# Patient Record
Sex: MALE | NOT HISPANIC OR LATINO | URBAN - METROPOLITAN AREA
[De-identification: names, ages, dates, MRNs, and addresses within clinical notes are randomized per-mention and may not be internally consistent; named-entity substitution may affect disease eponyms.]

---

## 2017-01-13 ENCOUNTER — EMERGENCY (EMERGENCY)
Facility: HOSPITAL | Age: 62
LOS: 1 days | Discharge: HOME | End: 2017-01-13
Attending: EMERGENCY MEDICINE | Admitting: EMERGENCY MEDICINE
Payer: COMMERCIAL

## 2017-01-13 VITALS
DIASTOLIC BLOOD PRESSURE: 73 MMHG | SYSTOLIC BLOOD PRESSURE: 136 MMHG | OXYGEN SATURATION: 100 % | HEART RATE: 86 BPM | RESPIRATION RATE: 22 BRPM

## 2017-01-13 VITALS
DIASTOLIC BLOOD PRESSURE: 66 MMHG | SYSTOLIC BLOOD PRESSURE: 124 MMHG | OXYGEN SATURATION: 99 % | TEMPERATURE: 102 F | RESPIRATION RATE: 18 BRPM | HEART RATE: 94 BPM

## 2017-01-13 LAB
ALBUMIN SERPL ELPH-MCNC: 3.8 G/DL — SIGNIFICANT CHANGE UP (ref 3.3–5)
ALP SERPL-CCNC: 68 U/L — SIGNIFICANT CHANGE UP (ref 40–120)
ALT FLD-CCNC: 34 U/L — SIGNIFICANT CHANGE UP (ref 4–41)
APPEARANCE UR: SIGNIFICANT CHANGE UP
AST SERPL-CCNC: 30 U/L — SIGNIFICANT CHANGE UP (ref 4–40)
BACTERIA # UR AUTO: SIGNIFICANT CHANGE UP
BASE EXCESS BLDV CALC-SCNC: -1 MMOL/L — SIGNIFICANT CHANGE UP
BASOPHILS # BLD AUTO: 0.04 K/UL — SIGNIFICANT CHANGE UP (ref 0–0.2)
BASOPHILS NFR BLD AUTO: 0.3 % — SIGNIFICANT CHANGE UP (ref 0–2)
BILIRUB SERPL-MCNC: 0.5 MG/DL — SIGNIFICANT CHANGE UP (ref 0.2–1.2)
BILIRUB UR-MCNC: NEGATIVE — SIGNIFICANT CHANGE UP
BLOOD GAS VENOUS - CREATININE: 1.2 MG/DL — SIGNIFICANT CHANGE UP (ref 0.5–1.3)
BLOOD UR QL VISUAL: HIGH
BUN SERPL-MCNC: 15 MG/DL — SIGNIFICANT CHANGE UP (ref 7–23)
CALCIUM SERPL-MCNC: 9.3 MG/DL — SIGNIFICANT CHANGE UP (ref 8.4–10.5)
CHLORIDE BLDV-SCNC: 100 MMOL/L — SIGNIFICANT CHANGE UP (ref 96–108)
CHLORIDE SERPL-SCNC: 94 MMOL/L — LOW (ref 98–107)
CO2 SERPL-SCNC: 20 MMOL/L — LOW (ref 22–31)
COLOR SPEC: YELLOW — SIGNIFICANT CHANGE UP
CREAT SERPL-MCNC: 1.28 MG/DL — SIGNIFICANT CHANGE UP (ref 0.5–1.3)
EOSINOPHIL # BLD AUTO: 0.03 K/UL — SIGNIFICANT CHANGE UP (ref 0–0.5)
EOSINOPHIL NFR BLD AUTO: 0.2 % — SIGNIFICANT CHANGE UP (ref 0–6)
GAS PNL BLDV: 132 MMOL/L — LOW (ref 136–146)
GLUCOSE BLDV-MCNC: 106 — HIGH (ref 70–99)
GLUCOSE SERPL-MCNC: 105 MG/DL — HIGH (ref 70–99)
GLUCOSE UR-MCNC: NEGATIVE — SIGNIFICANT CHANGE UP
GRAM STAIN BLOOD: SIGNIFICANT CHANGE UP
GRAM STAIN BLOOD: SIGNIFICANT CHANGE UP
HCO3 BLDV-SCNC: 22 MMOL/L — SIGNIFICANT CHANGE UP (ref 20–27)
HCT VFR BLD CALC: 40 % — SIGNIFICANT CHANGE UP (ref 39–50)
HCT VFR BLDV CALC: 41.2 % — SIGNIFICANT CHANGE UP (ref 39–51)
HGB BLD-MCNC: 13.6 G/DL — SIGNIFICANT CHANGE UP (ref 13–17)
HGB BLDV-MCNC: 13.4 G/DL — SIGNIFICANT CHANGE UP (ref 13–17)
IMM GRANULOCYTES NFR BLD AUTO: 0.5 % — SIGNIFICANT CHANGE UP (ref 0–1.5)
KETONES UR-MCNC: NEGATIVE — SIGNIFICANT CHANGE UP
LACTATE BLDV-MCNC: 2.3 MMOL/L — HIGH (ref 0.5–2)
LEUKOCYTE ESTERASE UR-ACNC: HIGH
LYMPHOCYTES # BLD AUTO: 1.96 K/UL — SIGNIFICANT CHANGE UP (ref 1–3.3)
LYMPHOCYTES # BLD AUTO: 15 % — SIGNIFICANT CHANGE UP (ref 13–44)
MCHC RBC-ENTMCNC: 29.1 PG — SIGNIFICANT CHANGE UP (ref 27–34)
MCHC RBC-ENTMCNC: 34 % — SIGNIFICANT CHANGE UP (ref 32–36)
MCV RBC AUTO: 85.5 FL — SIGNIFICANT CHANGE UP (ref 80–100)
MONOCYTES # BLD AUTO: 1.52 K/UL — HIGH (ref 0–0.9)
MONOCYTES NFR BLD AUTO: 11.6 % — SIGNIFICANT CHANGE UP (ref 2–14)
MUCOUS THREADS # UR AUTO: SIGNIFICANT CHANGE UP
NEUTROPHILS # BLD AUTO: 9.44 K/UL — HIGH (ref 1.8–7.4)
NEUTROPHILS NFR BLD AUTO: 72.4 % — SIGNIFICANT CHANGE UP (ref 43–77)
NITRITE UR-MCNC: NEGATIVE — SIGNIFICANT CHANGE UP
NON-SQ EPI CELLS # UR AUTO: <1 — SIGNIFICANT CHANGE UP
PCO2 BLDV: 38 MMHG — LOW (ref 41–51)
PH BLDV: 7.4 PH — SIGNIFICANT CHANGE UP (ref 7.32–7.43)
PH UR: 6 — SIGNIFICANT CHANGE UP (ref 4.6–8)
PLATELET # BLD AUTO: 358 K/UL — SIGNIFICANT CHANGE UP (ref 150–400)
PMV BLD: 9.5 FL — SIGNIFICANT CHANGE UP (ref 7–13)
PO2 BLDV: 23 MMHG — LOW (ref 35–40)
POTASSIUM BLDV-SCNC: 4 MMOL/L — SIGNIFICANT CHANGE UP (ref 3.4–4.5)
POTASSIUM SERPL-MCNC: 4.1 MMOL/L — SIGNIFICANT CHANGE UP (ref 3.5–5.3)
POTASSIUM SERPL-SCNC: 4.1 MMOL/L — SIGNIFICANT CHANGE UP (ref 3.5–5.3)
PROT SERPL-MCNC: 8 G/DL — SIGNIFICANT CHANGE UP (ref 6–8.3)
PROT UR-MCNC: 20 — SIGNIFICANT CHANGE UP
RBC # BLD: 4.68 M/UL — SIGNIFICANT CHANGE UP (ref 4.2–5.8)
RBC # FLD: 11.9 % — SIGNIFICANT CHANGE UP (ref 10.3–14.5)
RBC CASTS # UR COMP ASSIST: HIGH (ref 0–?)
SAO2 % BLDV: 34.5 % — LOW (ref 60–85)
SODIUM SERPL-SCNC: 133 MMOL/L — LOW (ref 135–145)
SP GR SPEC: 1.02 — SIGNIFICANT CHANGE UP (ref 1–1.03)
SPECIMEN SOURCE: SIGNIFICANT CHANGE UP
SPECIMEN SOURCE: SIGNIFICANT CHANGE UP
UROBILINOGEN FLD QL: NORMAL E.U. — SIGNIFICANT CHANGE UP (ref 0.1–0.2)
WBC # BLD: 13.06 K/UL — HIGH (ref 3.8–10.5)
WBC # FLD AUTO: 13.06 K/UL — HIGH (ref 3.8–10.5)
WBC UR QL: SIGNIFICANT CHANGE UP (ref 0–?)

## 2017-01-13 PROCEDURE — 99284 EMERGENCY DEPT VISIT MOD MDM: CPT

## 2017-01-13 PROCEDURE — 71020: CPT | Mod: 26

## 2017-01-13 RX ORDER — ACETAMINOPHEN 500 MG
650 TABLET ORAL ONCE
Qty: 0 | Refills: 0 | Status: COMPLETED | OUTPATIENT
Start: 2017-01-13 | End: 2017-01-13

## 2017-01-13 RX ORDER — MOXIFLOXACIN HYDROCHLORIDE TABLETS, 400 MG 400 MG/1
1 TABLET, FILM COATED ORAL
Qty: 56 | Refills: 0 | OUTPATIENT
Start: 2017-01-13 | End: 2017-02-10

## 2017-01-13 RX ORDER — IBUPROFEN 200 MG
600 TABLET ORAL ONCE
Qty: 0 | Refills: 0 | Status: COMPLETED | OUTPATIENT
Start: 2017-01-13 | End: 2017-01-13

## 2017-01-13 RX ORDER — SODIUM CHLORIDE 9 MG/ML
1000 INJECTION INTRAMUSCULAR; INTRAVENOUS; SUBCUTANEOUS ONCE
Qty: 0 | Refills: 0 | Status: COMPLETED | OUTPATIENT
Start: 2017-01-13 | End: 2017-01-13

## 2017-01-13 RX ADMIN — SODIUM CHLORIDE 1000 MILLILITER(S): 9 INJECTION INTRAMUSCULAR; INTRAVENOUS; SUBCUTANEOUS at 16:55

## 2017-01-13 RX ADMIN — Medication 650 MILLIGRAM(S): at 16:55

## 2017-01-13 RX ADMIN — Medication 600 MILLIGRAM(S): at 16:55

## 2017-01-13 NOTE — ED ADULT TRIAGE NOTE - CHIEF COMPLAINT QUOTE
Patient brought to ER from home by EMS for fever and weakness. Pt had a biopsy of prostate on 1/6/2017. IVL placed by EMS 20 gauge with NS infusing.

## 2017-01-13 NOTE — ED PROVIDER NOTE - MEDICAL DECISION MAKING DETAILS
60yo M PMHx HTN HLD p/w CC fever/weakness will send for cbc, cmp, vbg, blood cultures, cxr, ua, will give tylenol, motrin, fluids and reevaluate

## 2017-01-13 NOTE — ED ADULT NURSE NOTE - OBJECTIVE STATEMENT
Pt is 62yo M PMHx HTN HLD p/w CC fever/weakness - pt. states that for the past 2 weeks he has been experiencing URI symptoms: cough productive yellow sputum, weakness  - he was seen by urgent care about 2 weeks ago and treated with a zpack - he states that his symptoms still persist and over the past couple of days he feels his symptoms have been worsening and are now associated w/ night sweats, chills, and today spiked a fever. Pt. reports having a prostate biopsy on 1/6/17 with no complications since. Denies HA, CP, SOB, N/V/D.

## 2017-01-15 LAB — SPECIMEN SOURCE: SIGNIFICANT CHANGE UP

## 2017-01-16 ENCOUNTER — INPATIENT (INPATIENT)
Facility: HOSPITAL | Age: 62
LOS: 1 days | Discharge: HOME HEALTH | End: 2017-01-18
Attending: HOSPITALIST | Admitting: HOSPITALIST
Payer: COMMERCIAL

## 2017-01-16 VITALS
OXYGEN SATURATION: 98 % | SYSTOLIC BLOOD PRESSURE: 125 MMHG | RESPIRATION RATE: 18 BRPM | HEART RATE: 93 BPM | TEMPERATURE: 98 F | DIASTOLIC BLOOD PRESSURE: 85 MMHG

## 2017-01-16 DIAGNOSIS — E78.5 HYPERLIPIDEMIA, UNSPECIFIED: ICD-10-CM

## 2017-01-16 DIAGNOSIS — B96.20 UNSPECIFIED ESCHERICHIA COLI [E. COLI] AS THE CAUSE OF DISEASES CLASSIFIED ELSEWHERE: ICD-10-CM

## 2017-01-16 DIAGNOSIS — Z09 ENCOUNTER FOR FOLLOW-UP EXAMINATION AFTER COMPLETED TREATMENT FOR CONDITIONS OTHER THAN MALIGNANT NEOPLASM: Chronic | ICD-10-CM

## 2017-01-16 DIAGNOSIS — I10 ESSENTIAL (PRIMARY) HYPERTENSION: ICD-10-CM

## 2017-01-16 DIAGNOSIS — A49.9 BACTERIAL INFECTION, UNSPECIFIED: ICD-10-CM

## 2017-01-16 LAB
-  AMIKACIN: SIGNIFICANT CHANGE UP
-  AMPICILLIN/SULBACTAM: SIGNIFICANT CHANGE UP
-  AMPICILLIN: SIGNIFICANT CHANGE UP
-  AZTREONAM: SIGNIFICANT CHANGE UP
-  CEFAZOLIN: SIGNIFICANT CHANGE UP
-  CEFEPIME: SIGNIFICANT CHANGE UP
-  CEFOXITIN: SIGNIFICANT CHANGE UP
-  CEFTAZIDIME: SIGNIFICANT CHANGE UP
-  CEFTRIAXONE: SIGNIFICANT CHANGE UP
-  CIPROFLOXACIN: SIGNIFICANT CHANGE UP
-  ERTAPENEM: SIGNIFICANT CHANGE UP
-  GENTAMICIN: SIGNIFICANT CHANGE UP
-  IMIPENEM: SIGNIFICANT CHANGE UP
-  LEVOFLOXACIN: SIGNIFICANT CHANGE UP
-  MEROPENEM: SIGNIFICANT CHANGE UP
-  NITROFURANTOIN: SIGNIFICANT CHANGE UP
-  PIPERACILLIN/TAZOBACTAM: SIGNIFICANT CHANGE UP
-  TIGECYCLINE: SIGNIFICANT CHANGE UP
-  TOBRAMYCIN: SIGNIFICANT CHANGE UP
-  TRIMETHOPRIM/SULFAMETHOXAZOLE: SIGNIFICANT CHANGE UP
ALBUMIN SERPL ELPH-MCNC: 3.9 G/DL — SIGNIFICANT CHANGE UP (ref 3.3–5)
ALP SERPL-CCNC: 72 U/L — SIGNIFICANT CHANGE UP (ref 40–120)
ALT FLD-CCNC: 24 U/L — SIGNIFICANT CHANGE UP (ref 4–41)
ANISOCYTOSIS BLD QL: SLIGHT — SIGNIFICANT CHANGE UP
AST SERPL-CCNC: 15 U/L — SIGNIFICANT CHANGE UP (ref 4–40)
BACTERIA UR CULT: SIGNIFICANT CHANGE UP
BASE EXCESS BLDV CALC-SCNC: 2.3 MMOL/L — SIGNIFICANT CHANGE UP
BASOPHILS # BLD AUTO: 0.08 K/UL — SIGNIFICANT CHANGE UP (ref 0–0.2)
BASOPHILS NFR BLD AUTO: 0.8 % — SIGNIFICANT CHANGE UP (ref 0–2)
BILIRUB SERPL-MCNC: 0.3 MG/DL — SIGNIFICANT CHANGE UP (ref 0.2–1.2)
BLOOD GAS VENOUS - CREATININE: 1.17 MG/DL — SIGNIFICANT CHANGE UP (ref 0.5–1.3)
BUN SERPL-MCNC: 13 MG/DL — SIGNIFICANT CHANGE UP (ref 7–23)
CALCIUM SERPL-MCNC: 9.9 MG/DL — SIGNIFICANT CHANGE UP (ref 8.4–10.5)
CHLORIDE BLDV-SCNC: 99 MMOL/L — SIGNIFICANT CHANGE UP (ref 96–108)
CHLORIDE SERPL-SCNC: 96 MMOL/L — LOW (ref 98–107)
CO2 SERPL-SCNC: 25 MMOL/L — SIGNIFICANT CHANGE UP (ref 22–31)
CREAT SERPL-MCNC: 1.14 MG/DL — SIGNIFICANT CHANGE UP (ref 0.5–1.3)
EOSINOPHIL # BLD AUTO: 0.45 K/UL — SIGNIFICANT CHANGE UP (ref 0–0.5)
EOSINOPHIL NFR BLD AUTO: 4.5 % — SIGNIFICANT CHANGE UP (ref 0–6)
GAS PNL BLDV: 134 MMOL/L — LOW (ref 136–146)
GLUCOSE BLDV-MCNC: 103 — HIGH (ref 70–99)
GLUCOSE SERPL-MCNC: 102 MG/DL — HIGH (ref 70–99)
HCO3 BLDV-SCNC: 23 MMOL/L — SIGNIFICANT CHANGE UP (ref 20–27)
HCT VFR BLD CALC: 39.9 % — SIGNIFICANT CHANGE UP (ref 39–50)
HCT VFR BLDV CALC: 44.3 % — SIGNIFICANT CHANGE UP (ref 39–51)
HGB BLD-MCNC: 13.8 G/DL — SIGNIFICANT CHANGE UP (ref 13–17)
HGB BLDV-MCNC: 14.5 G/DL — SIGNIFICANT CHANGE UP (ref 13–17)
IMM GRANULOCYTES NFR BLD AUTO: 0.8 % — SIGNIFICANT CHANGE UP (ref 0–1.5)
LACTATE BLDV-MCNC: 1.7 MMOL/L — SIGNIFICANT CHANGE UP (ref 0.5–2)
LYMPHOCYTES # BLD AUTO: 3.09 K/UL — SIGNIFICANT CHANGE UP (ref 1–3.3)
LYMPHOCYTES # BLD AUTO: 30.7 % — SIGNIFICANT CHANGE UP (ref 13–44)
MANUAL SMEAR VERIFICATION: SIGNIFICANT CHANGE UP
MCHC RBC-ENTMCNC: 29.3 PG — SIGNIFICANT CHANGE UP (ref 27–34)
MCHC RBC-ENTMCNC: 34.6 % — SIGNIFICANT CHANGE UP (ref 32–36)
MCV RBC AUTO: 84.7 FL — SIGNIFICANT CHANGE UP (ref 80–100)
METHOD TYPE: SIGNIFICANT CHANGE UP
MONOCYTES # BLD AUTO: 1.4 K/UL — HIGH (ref 0–0.9)
MONOCYTES NFR BLD AUTO: 13.9 % — SIGNIFICANT CHANGE UP (ref 2–14)
NEUTROPHILS # BLD AUTO: 4.96 K/UL — SIGNIFICANT CHANGE UP (ref 1.8–7.4)
NEUTROPHILS NFR BLD AUTO: 49.3 % — SIGNIFICANT CHANGE UP (ref 43–77)
ORGANISM # SPEC MICROSCOPIC CNT: SIGNIFICANT CHANGE UP
ORGANISM # SPEC MICROSCOPIC CNT: SIGNIFICANT CHANGE UP
OVALOCYTES BLD QL SMEAR: SLIGHT — SIGNIFICANT CHANGE UP
PCO2 BLDV: 60 MMHG — HIGH (ref 41–51)
PH BLDV: 7.29 PH — LOW (ref 7.32–7.43)
PLATELET # BLD AUTO: 421 K/UL — HIGH (ref 150–400)
PLATELET COUNT - ESTIMATE: NORMAL — SIGNIFICANT CHANGE UP
PMV BLD: 9 FL — SIGNIFICANT CHANGE UP (ref 7–13)
PO2 BLDV: < 24 MMHG — LOW (ref 35–40)
POTASSIUM BLDV-SCNC: 4.2 MMOL/L — SIGNIFICANT CHANGE UP (ref 3.4–4.5)
POTASSIUM SERPL-MCNC: 4.4 MMOL/L — SIGNIFICANT CHANGE UP (ref 3.5–5.3)
POTASSIUM SERPL-SCNC: 4.4 MMOL/L — SIGNIFICANT CHANGE UP (ref 3.5–5.3)
PROT SERPL-MCNC: 8.1 G/DL — SIGNIFICANT CHANGE UP (ref 6–8.3)
RBC # BLD: 4.71 M/UL — SIGNIFICANT CHANGE UP (ref 4.2–5.8)
RBC # FLD: 12.1 % — SIGNIFICANT CHANGE UP (ref 10.3–14.5)
SAO2 % BLDV: 17.8 % — LOW (ref 60–85)
SODIUM SERPL-SCNC: 135 MMOL/L — SIGNIFICANT CHANGE UP (ref 135–145)
WBC # BLD: 10.06 K/UL — SIGNIFICANT CHANGE UP (ref 3.8–10.5)
WBC # FLD AUTO: 10.06 K/UL — SIGNIFICANT CHANGE UP (ref 3.8–10.5)

## 2017-01-16 PROCEDURE — 99223 1ST HOSP IP/OBS HIGH 75: CPT

## 2017-01-16 RX ORDER — ATORVASTATIN CALCIUM 80 MG/1
10 TABLET, FILM COATED ORAL AT BEDTIME
Qty: 0 | Refills: 0 | Status: DISCONTINUED | OUTPATIENT
Start: 2017-01-16 | End: 2017-01-18

## 2017-01-16 RX ORDER — ERTAPENEM SODIUM 1 G/1
1000 INJECTION, POWDER, LYOPHILIZED, FOR SOLUTION INTRAMUSCULAR; INTRAVENOUS ONCE
Qty: 0 | Refills: 0 | Status: COMPLETED | OUTPATIENT
Start: 2017-01-16 | End: 2017-01-16

## 2017-01-16 RX ORDER — ERTAPENEM SODIUM 1 G/1
INJECTION, POWDER, LYOPHILIZED, FOR SOLUTION INTRAMUSCULAR; INTRAVENOUS
Qty: 0 | Refills: 0 | Status: DISCONTINUED | OUTPATIENT
Start: 2017-01-16 | End: 2017-01-18

## 2017-01-16 RX ORDER — LISINOPRIL 2.5 MG/1
5 TABLET ORAL DAILY
Qty: 0 | Refills: 0 | Status: DISCONTINUED | OUTPATIENT
Start: 2017-01-16 | End: 2017-01-18

## 2017-01-16 RX ORDER — ENOXAPARIN SODIUM 100 MG/ML
40 INJECTION SUBCUTANEOUS EVERY 24 HOURS
Qty: 0 | Refills: 0 | Status: DISCONTINUED | OUTPATIENT
Start: 2017-01-16 | End: 2017-01-18

## 2017-01-16 RX ORDER — ERTAPENEM SODIUM 1 G/1
1000 INJECTION, POWDER, LYOPHILIZED, FOR SOLUTION INTRAMUSCULAR; INTRAVENOUS EVERY 24 HOURS
Qty: 0 | Refills: 0 | Status: DISCONTINUED | OUTPATIENT
Start: 2017-01-17 | End: 2017-01-18

## 2017-01-16 RX ADMIN — ERTAPENEM SODIUM 120 MILLIGRAM(S): 1 INJECTION, POWDER, LYOPHILIZED, FOR SOLUTION INTRAMUSCULAR; INTRAVENOUS at 18:14

## 2017-01-16 NOTE — ED PROVIDER NOTE - MEDICAL DECISION MAKING DETAILS
61M p/w called back for resistant organism.  Resistant to all appropriate oral antibiotics.  Discussed with ID - this infections requires IV abx and admission - they will see pt in hospital tomorrow AM.  Will arrange for admission and rx ertapenem as recommended.  Clinically not septic.  Will place on isolation.

## 2017-01-16 NOTE — ED POST DISCHARGE NOTE - RESULT SUMMARY
UCX: Gram neg rods 10,000-49,000 ( prelim ). Pt treated with Cipro UCX: Gram neg rods 10,000-49,000 ( prelim ). Pt treated with Cipro s/p prostate biopsy 1/6/17

## 2017-01-16 NOTE — ED ADULT TRIAGE NOTE - CHIEF COMPLAINT QUOTE
p/t is 8 weeks pregnant c/o of unable to tolerate any food or liquid for past few days sent by pmd for iv hydration p/t seen in rd few days ago for dysuria and lower back pain p/t called back today for positive urine cultures

## 2017-01-16 NOTE — ED PROVIDER NOTE - OBJECTIVE STATEMENT
61M p/w called back for +urine culture result growing ESBL E-coli.  Pt was seen Jan 13th for fever to 102.6 after having a respiratory infection since dec 25th despite zpack.  Pt had had a prostate biopsy Jan 6th at OSH.  PMD not affiliated either.  Blood cultures were negative.  Pt was discharged on cipro x 4 weeks for presumed prostatitis.  Pt feeling better, no further fever, but endorses some dysuria starting 2 d ago as well as occasional hematuria.  Otherwise unremarkable ROS.

## 2017-01-16 NOTE — H&P ADULT. - HISTORY OF PRESENT ILLNESS
60 y/o M with HTN, and HLD presents to the ED after urine culture resulted positive for ESBL producing E coli.  Pt reports he had prostate biopsy performed on 1/6 for mildly elevated PSA.  Prior.  Following the procedure he felt well without issues.  He developed fever to 102.6 on 1/13 for which he presented to the ED.  Prior to ED visit, he had been having symptoms of sinus pressure since late December, and had taken a course of azithromycin early January.  In the ED, he had UA with mod LE and + WBC, and was discharged home on a 4 week course of Cipro to treat presumed prostatitis.  Pt reports he developed some dysuria following his ER visit, but that his fever has resolved.  He reports no perineal or pelvic pain.  He was called back to the ED today because his urine culture resulted positive for ESBL producing E coli.  He was given ertapenem in the ED.

## 2017-01-16 NOTE — ED POST DISCHARGE NOTE - OTHER COMMUNICATION
Plan: Admin team to follow up final results Plan: Admin team to follow up final results. Micro called UCX: Ecoli + ESBL 10,000-49,000. Pt treated with cipro (sensitive). Pt called and informed UCX results. Pt denies fever or worsening s/s but has h/o recent prostate biopsy  Pt instructed to return to ED for evaluation Plan: Admin team to follow up final results. Micro called UCX: Ecoli + ESBL 10,000-49,000. Pt treated with cipro (sensitive). Pt called and informed UCX results. Pt denies fever or worsening s/s but has h/o recent prostate biopsy 1/6/17  Pt instructed to return to ED for evaluation Plan: Admin team to follow up final results. Micro called UCX: Ecoli + ESBL 10,000-49,000. Pt treated with cipro (sensitive). Pt called and informed UCX results. Pt denies fever or worsening s/s but has h/o recent prostate biopsy 1/6/17  Pt instructed to return to ED for evaluation and treatment today. Pt acknowledges understanding of this information and states he will return to ED today

## 2017-01-16 NOTE — H&P ADULT. - ASSESSMENT
60 y/o M with HTN and HLD presents after urine culture mildly positive for ESBL producing E coli with recent fever and mild dysuria both resolved.

## 2017-01-16 NOTE — ED ADULT NURSE NOTE - CHIEF COMPLAINT QUOTE
p/t seen in rd few days ago for dysuria and lower back pain p/t called back today for positive urine cultures

## 2017-01-16 NOTE — H&P ADULT. - FAMILY HISTORY
Father  Still living? Unknown  Family history of prostate cancer in father, Age at diagnosis: Age Unknown

## 2017-01-16 NOTE — ED PROVIDER NOTE - ENMT, MLM
Airway patent, Nasal mucosa clear. Mouth with dry normal mucosa. Throat has no vesicles, no oropharyngeal exudates and uvula is midline.

## 2017-01-16 NOTE — ED ADULT NURSE NOTE - OBJECTIVE STATEMENT
pt received a&ox3, called back for urine culture, pt states he was here a few days ago after feeling febrile post prostate biopsy, pt denies any symptoms currently, 20 gauge IV placed in right ac, labs drawn and sent, will continue to monitor.

## 2017-01-17 LAB
BUN SERPL-MCNC: 13 MG/DL — SIGNIFICANT CHANGE UP (ref 7–23)
CALCIUM SERPL-MCNC: 9.3 MG/DL — SIGNIFICANT CHANGE UP (ref 8.4–10.5)
CHLORIDE SERPL-SCNC: 97 MMOL/L — LOW (ref 98–107)
CO2 SERPL-SCNC: 22 MMOL/L — SIGNIFICANT CHANGE UP (ref 22–31)
CREAT SERPL-MCNC: 1.1 MG/DL — SIGNIFICANT CHANGE UP (ref 0.5–1.3)
GLUCOSE SERPL-MCNC: 100 MG/DL — HIGH (ref 70–99)
HCT VFR BLD CALC: 36.4 % — LOW (ref 39–50)
HGB BLD-MCNC: 12.6 G/DL — LOW (ref 13–17)
MAGNESIUM SERPL-MCNC: 2.2 MG/DL — SIGNIFICANT CHANGE UP (ref 1.6–2.6)
MCHC RBC-ENTMCNC: 29.1 PG — SIGNIFICANT CHANGE UP (ref 27–34)
MCHC RBC-ENTMCNC: 34.6 % — SIGNIFICANT CHANGE UP (ref 32–36)
MCV RBC AUTO: 84.1 FL — SIGNIFICANT CHANGE UP (ref 80–100)
PLATELET # BLD AUTO: 407 K/UL — HIGH (ref 150–400)
PMV BLD: 9 FL — SIGNIFICANT CHANGE UP (ref 7–13)
POTASSIUM SERPL-MCNC: 4.9 MMOL/L — SIGNIFICANT CHANGE UP (ref 3.5–5.3)
POTASSIUM SERPL-SCNC: 4.9 MMOL/L — SIGNIFICANT CHANGE UP (ref 3.5–5.3)
RBC # BLD: 4.33 M/UL — SIGNIFICANT CHANGE UP (ref 4.2–5.8)
RBC # FLD: 12.1 % — SIGNIFICANT CHANGE UP (ref 10.3–14.5)
SODIUM SERPL-SCNC: 135 MMOL/L — SIGNIFICANT CHANGE UP (ref 135–145)
SPECIMEN SOURCE: SIGNIFICANT CHANGE UP
SPECIMEN SOURCE: SIGNIFICANT CHANGE UP
WBC # BLD: 9.47 K/UL — SIGNIFICANT CHANGE UP (ref 3.8–10.5)
WBC # FLD AUTO: 9.47 K/UL — SIGNIFICANT CHANGE UP (ref 3.8–10.5)

## 2017-01-17 PROCEDURE — 99233 SBSQ HOSP IP/OBS HIGH 50: CPT

## 2017-01-17 PROCEDURE — 99223 1ST HOSP IP/OBS HIGH 75: CPT | Mod: GC

## 2017-01-17 RX ADMIN — LISINOPRIL 5 MILLIGRAM(S): 2.5 TABLET ORAL at 06:38

## 2017-01-17 RX ADMIN — ATORVASTATIN CALCIUM 10 MILLIGRAM(S): 80 TABLET, FILM COATED ORAL at 00:23

## 2017-01-17 RX ADMIN — ENOXAPARIN SODIUM 40 MILLIGRAM(S): 100 INJECTION SUBCUTANEOUS at 06:38

## 2017-01-17 RX ADMIN — ATORVASTATIN CALCIUM 10 MILLIGRAM(S): 80 TABLET, FILM COATED ORAL at 21:07

## 2017-01-17 RX ADMIN — ERTAPENEM SODIUM 120 MILLIGRAM(S): 1 INJECTION, POWDER, LYOPHILIZED, FOR SOLUTION INTRAMUSCULAR; INTRAVENOUS at 17:00

## 2017-01-17 NOTE — PATIENT PROFILE ADULT. - TEACHING/LEARNING LEARNING PREFERENCES
written material/pictorial/video/group instruction/skill demonstration/verbal instruction/computer/internet/individual instruction/audio

## 2017-01-18 ENCOUNTER — TRANSCRIPTION ENCOUNTER (OUTPATIENT)
Age: 62
End: 2017-01-18

## 2017-01-18 VITALS
SYSTOLIC BLOOD PRESSURE: 118 MMHG | OXYGEN SATURATION: 100 % | DIASTOLIC BLOOD PRESSURE: 69 MMHG | HEART RATE: 70 BPM | RESPIRATION RATE: 18 BRPM | TEMPERATURE: 98 F

## 2017-01-18 LAB
BACTERIA BLD CULT: SIGNIFICANT CHANGE UP
BACTERIA BLD CULT: SIGNIFICANT CHANGE UP
BUN SERPL-MCNC: 17 MG/DL — SIGNIFICANT CHANGE UP (ref 7–23)
CALCIUM SERPL-MCNC: 9.3 MG/DL — SIGNIFICANT CHANGE UP (ref 8.4–10.5)
CHLORIDE SERPL-SCNC: 99 MMOL/L — SIGNIFICANT CHANGE UP (ref 98–107)
CO2 SERPL-SCNC: 22 MMOL/L — SIGNIFICANT CHANGE UP (ref 22–31)
CREAT SERPL-MCNC: 1.12 MG/DL — SIGNIFICANT CHANGE UP (ref 0.5–1.3)
GLUCOSE SERPL-MCNC: 104 MG/DL — HIGH (ref 70–99)
HCT VFR BLD CALC: 38.9 % — LOW (ref 39–50)
HGB BLD-MCNC: 13.4 G/DL — SIGNIFICANT CHANGE UP (ref 13–17)
MAGNESIUM SERPL-MCNC: 2.4 MG/DL — SIGNIFICANT CHANGE UP (ref 1.6–2.6)
MCHC RBC-ENTMCNC: 29.2 PG — SIGNIFICANT CHANGE UP (ref 27–34)
MCHC RBC-ENTMCNC: 34.4 % — SIGNIFICANT CHANGE UP (ref 32–36)
MCV RBC AUTO: 84.7 FL — SIGNIFICANT CHANGE UP (ref 80–100)
PLATELET # BLD AUTO: 415 K/UL — HIGH (ref 150–400)
PMV BLD: 9.3 FL — SIGNIFICANT CHANGE UP (ref 7–13)
POTASSIUM SERPL-MCNC: 4.5 MMOL/L — SIGNIFICANT CHANGE UP (ref 3.5–5.3)
POTASSIUM SERPL-SCNC: 4.5 MMOL/L — SIGNIFICANT CHANGE UP (ref 3.5–5.3)
RBC # BLD: 4.59 M/UL — SIGNIFICANT CHANGE UP (ref 4.2–5.8)
RBC # FLD: 12.1 % — SIGNIFICANT CHANGE UP (ref 10.3–14.5)
SODIUM SERPL-SCNC: 136 MMOL/L — SIGNIFICANT CHANGE UP (ref 135–145)
WBC # BLD: 8.78 K/UL — SIGNIFICANT CHANGE UP (ref 3.8–10.5)
WBC # FLD AUTO: 8.78 K/UL — SIGNIFICANT CHANGE UP (ref 3.8–10.5)

## 2017-01-18 PROCEDURE — 36569 INSJ PICC 5 YR+ W/O IMAGING: CPT | Mod: 52,GC

## 2017-01-18 PROCEDURE — 76937 US GUIDE VASCULAR ACCESS: CPT | Mod: 26,GC

## 2017-01-18 PROCEDURE — 99239 HOSP IP/OBS DSCHRG MGMT >30: CPT

## 2017-01-18 RX ORDER — SODIUM CHLORIDE 0.65 %
1 AEROSOL, SPRAY (ML) NASAL EVERY 4 HOURS
Qty: 0 | Refills: 0 | Status: DISCONTINUED | OUTPATIENT
Start: 2017-01-18 | End: 2017-01-18

## 2017-01-18 RX ORDER — ERTAPENEM SODIUM 1 G/1
1 INJECTION, POWDER, LYOPHILIZED, FOR SOLUTION INTRAMUSCULAR; INTRAVENOUS
Qty: 7 | Refills: 0 | OUTPATIENT
Start: 2017-01-18 | End: 2017-01-23

## 2017-01-18 RX ORDER — SODIUM CHLORIDE 0.65 %
1 AEROSOL, SPRAY (ML) NASAL
Qty: 1 | Refills: 0 | OUTPATIENT
Start: 2017-01-18

## 2017-01-18 RX ADMIN — LISINOPRIL 5 MILLIGRAM(S): 2.5 TABLET ORAL at 05:59

## 2017-01-18 RX ADMIN — ENOXAPARIN SODIUM 40 MILLIGRAM(S): 100 INJECTION SUBCUTANEOUS at 06:00

## 2017-01-18 RX ADMIN — ERTAPENEM SODIUM 120 MILLIGRAM(S): 1 INJECTION, POWDER, LYOPHILIZED, FOR SOLUTION INTRAMUSCULAR; INTRAVENOUS at 17:45

## 2017-01-18 RX ADMIN — Medication 200 MILLIGRAM(S): at 09:16

## 2017-01-18 NOTE — DISCHARGE NOTE ADULT - PATIENT PORTAL LINK FT
“You can access the FollowHealth Patient Portal, offered by [facility], by registering with the following website: [email or web address]”

## 2017-01-18 NOTE — DISCHARGE NOTE ADULT - MEDICATION SUMMARY - MEDICATIONS TO STOP TAKING
I will STOP taking the medications listed below when I get home from the hospital:    Cipro 500 mg oral tablet  -- 1 tab(s) by mouth 2 times a day  -- Avoid prolonged or excessive exposure to direct and/or artificial sunlight while taking this medication.  Check with your doctor before becoming pregnant.  Do not take dairy products, antacids, or iron preparations within one hour of this medication.  Finish all this medication unless otherwise directed by prescriber.  Medication should be taken with plenty of water.

## 2017-01-18 NOTE — DISCHARGE NOTE ADULT - ADDITIONAL INSTRUCTIONS
Please follow up with Infectious Disease physician, Dr. Quintana within one week.  Please follow up with PMD, Dr. Pollard within one week.

## 2017-01-18 NOTE — DISCHARGE NOTE ADULT - CARE PROVIDER_API CALL
Suzi Pollard  PMD  Phone: (   )    -  Fax: (   )    -    Sanya Quintana), Geriatric Medicine; Infectious Disease; Internal Medicine  [address] [place] [address]  Phone: [phone]  Fax: [phone]

## 2017-01-18 NOTE — DISCHARGE NOTE ADULT - CARE PROVIDERS DIRECT ADDRESSES
,DirectAddress_Unknown,[email or web address],DirectAddress_Unknown

## 2017-01-18 NOTE — DISCHARGE NOTE ADULT - MEDICATION SUMMARY - MEDICATIONS TO TAKE
I will START or STAY ON the medications listed below when I get home from the hospital:    lisinopril 5 mg oral tablet  -- 1 tab(s) by mouth once a day  -- Indication: For Essential (primary) hypertension    pravastatin 10 mg oral tablet  -- 1 tab(s) by mouth once a day  -- Indication: For Hld    ertapenem 1 g injection  -- Ertapenem1 gram(s) IVPB via midline once a day to complete 7 days total- till 1/23/17  -- Indication: For Escherichia coli urinary tract infection    guaiFENesin 100 mg/5 mL oral liquid  -- 10 milliliter(s) by mouth every 6 hours, As needed, Cough  -- Indication: For Cough    sodium chloride 0.65% nasal spray  -- 1 spray(s) into nose 4 times a day, As Needed  -- Indication: For Nasal congestion

## 2017-01-18 NOTE — DISCHARGE NOTE ADULT - HOSPITAL COURSE
62 y/o M with HTN, and HLD presents to the ED after urine culture resulted positive for ESBL producing E coli.  Pt reports he had prostate biopsy performed on 1/6 for mildly elevated PSA.  Prior.  Following the procedure he felt well without issues.  He developed fever to 102.6 on 1/13 for which he presented to the ED.  Prior to ED visit, he had been having symptoms of sinus pressure since late December, and had taken a course of azithromycin early January.  In the ED, he had UA with mod LE and + WBC, and was discharged home on a 4 week course of Cipro to treat presumed prostatitis.  Pt reports he developed some dysuria following his ER visit, but that his fever has resolved.  He reports no perineal or pelvic pain.  He was called back to the ED today because his urine culture resulted positive for ESBL producing E coli.  He was given ertapenem in the ED.      ESBL (extended spectrum beta-lactamase) producing bacteria infection.      -Unclear if acute infection or colonization  - resistant to Cipro/Gentamycin  - ID consulted, Dr. Quintana - Ertapenem 1g via midline for a total 7 days- 1/23/17  - outpt f/u with TEO Quintana  - f/u outpt w/PMD, Dr Pollard     Essential (primary) hypertension  - continue lisinopril.      Dyslipidemia   - continue statin. 60 y/o M with HTN, and HLD presents to the ED after urine culture resulted positive for ESBL producing E coli.  Pt reports he had prostate biopsy performed on 1/6 for mildly elevated PSA.  Prior.  Following the procedure he felt well without issues.  He developed fever to 102.6 on 1/13 for which he presented to the ED.  Prior to ED visit, he had been having symptoms of sinus pressure since late December, and had taken a course of azithromycin early January.  In the ED, he had UA with mod LE and + WBC, and was discharged home on a 4 week course of Cipro to treat presumed prostatitis.  Pt reports he developed some dysuria following his ER visit, but that his fever has resolved.  He reports no perineal or pelvic pain.  He was called back to the ED today because his urine culture resulted positive for ESBL producing E coli.  He was given ertapenem in the ED.      ESBL (extended spectrum beta-lactamase) producing bacteria infection.      -Unclear if acute infection or colonization  - resistant to Cipro/Gentamycin  - ID consulted, Dr. Quintana - Ertapenem 1g via midline for a total 7 days- 1/23/17  - outpt f/u with TEO Quintana  - f/u outpt w/PMD, Dr Pollard     Essential (primary) hypertension  - continue lisinopril.      Dyslipidemia   - continue statin.     Dispo: Home with home care

## 2017-01-18 NOTE — DISCHARGE NOTE ADULT - PLAN OF CARE
resolved Please continue Ertapenem via Midline until 1/23/17 to complete 7 days of treatment for E.coli +ESBL in urine. Please follow up with your primary doctor, PCP- Dr. Pollard after finishing treatment and infection disease physician, Dr. Quintana. Please call your doctor immediately if you developed fever, chills or difficult urinating. Please follow up with urologist as scheduled. Please continue Ertapenem via Midline until 1/23/17 to complete 7 days of treatment for E.coli +ESBL in urine. Please follow up with your primary doctor, PCP- Dr. Pollard after finishing treatment and infection disease physician, Dr. Quintana. Please call your doctor immediately if you developed fever, chills or difficult urinating. well controlled Please continue to check your blood pressure and follow up with Dr. Atul DOSS. Please call your doctor immediately if you developed any chest pain, palpitations or dizziness. Please continue your medications as recommended by your PMD, Dr. Pollard.

## 2017-01-18 NOTE — DISCHARGE NOTE ADULT - SECONDARY DIAGNOSIS.
ESBL (extended spectrum beta-lactamase) producing bacteria infection Essential (primary) hypertension Dyslipidemia

## 2017-01-18 NOTE — DISCHARGE NOTE ADULT - PROVIDER TOKENS
FREE:[LAST:[Pollard],FIRST:[Kavity],PHONE:[(   )    -],FAX:[(   )    -],ADDRESS:[PMD]],TOKEN:'[id number]'

## 2017-01-18 NOTE — DISCHARGE NOTE ADULT - CARE PLAN
Principal Discharge DX:	Escherichia coli urinary tract infection  Goal:	resolved  Instructions for follow-up, activity and diet:	Please continue Ertapenem via Midline until 1/23/17 to complete 7 days of treatment for E.coli +ESBL in urine. Please follow up with your primary doctor, PCP- Dr. Pollard after finishing treatment and infection disease physician, Dr. Quintana. Please call your doctor immediately if you developed fever, chills or difficult urinating. Please follow up with urologist as scheduled.  Secondary Diagnosis:	ESBL (extended spectrum beta-lactamase) producing bacteria infection  Instructions for follow-up, activity and diet:	Please continue Ertapenem via Midline until 1/23/17 to complete 7 days of treatment for E.coli +ESBL in urine. Please follow up with your primary doctor, PCP- Dr. Pollard after finishing treatment and infection disease physician, Dr. Quintana. Please call your doctor immediately if you developed fever, chills or difficult urinating.  Secondary Diagnosis:	Essential (primary) hypertension  Goal:	well controlled  Instructions for follow-up, activity and diet:	Please continue to check your blood pressure and follow up with PMD, Dr. Pollard. Please call your doctor immediately if you developed any chest pain, palpitations or dizziness.  Secondary Diagnosis:	Dyslipidemia  Goal:	well controlled  Instructions for follow-up, activity and diet:	Please continue your medications as recommended by your PMDDr. Pollard.

## 2017-01-21 LAB
BACTERIA BLD CULT: SIGNIFICANT CHANGE UP
BACTERIA BLD CULT: SIGNIFICANT CHANGE UP

## 2017-01-26 ENCOUNTER — OUTPATIENT (OUTPATIENT)
Dept: OUTPATIENT SERVICES | Facility: HOSPITAL | Age: 62
LOS: 1 days | Discharge: HOME | End: 2017-01-26

## 2017-01-26 ENCOUNTER — APPOINTMENT (OUTPATIENT)
Dept: UROLOGY | Facility: CLINIC | Age: 62
End: 2017-01-26

## 2017-01-26 VITALS
BODY MASS INDEX: 26.98 KG/M2 | DIASTOLIC BLOOD PRESSURE: 89 MMHG | HEIGHT: 64 IN | HEART RATE: 70 BPM | TEMPERATURE: 97.7 F | WEIGHT: 158 LBS | SYSTOLIC BLOOD PRESSURE: 139 MMHG

## 2017-01-26 DIAGNOSIS — Z80.42 FAMILY HISTORY OF MALIGNANT NEOPLASM OF PROSTATE: ICD-10-CM

## 2017-01-26 DIAGNOSIS — Z86.39 PERSONAL HISTORY OF OTHER ENDOCRINE, NUTRITIONAL AND METABOLIC DISEASE: ICD-10-CM

## 2017-01-26 DIAGNOSIS — Z09 ENCOUNTER FOR FOLLOW-UP EXAMINATION AFTER COMPLETED TREATMENT FOR CONDITIONS OTHER THAN MALIGNANT NEOPLASM: Chronic | ICD-10-CM

## 2017-01-26 DIAGNOSIS — Z86.79 PERSONAL HISTORY OF OTHER DISEASES OF THE CIRCULATORY SYSTEM: ICD-10-CM

## 2017-01-26 RX ORDER — PRAVASTATIN SODIUM 80 MG/1
TABLET ORAL
Refills: 0 | Status: ACTIVE | COMMUNITY

## 2017-01-26 RX ORDER — LISINOPRIL 30 MG/1
TABLET ORAL
Refills: 0 | Status: ACTIVE | COMMUNITY

## 2017-01-27 ENCOUNTER — APPOINTMENT (OUTPATIENT)
Dept: RADIATION ONCOLOGY | Facility: CLINIC | Age: 62
End: 2017-01-27

## 2017-01-27 VITALS
RESPIRATION RATE: 16 BRPM | WEIGHT: 158.84 LBS | OXYGEN SATURATION: 98 % | DIASTOLIC BLOOD PRESSURE: 81 MMHG | HEART RATE: 79 BPM | BODY MASS INDEX: 27.12 KG/M2 | HEIGHT: 64 IN | SYSTOLIC BLOOD PRESSURE: 130 MMHG

## 2017-02-09 ENCOUNTER — APPOINTMENT (OUTPATIENT)
Dept: UROLOGY | Facility: CLINIC | Age: 62
End: 2017-02-09

## 2017-02-14 ENCOUNTER — APPOINTMENT (OUTPATIENT)
Dept: UROLOGY | Facility: CLINIC | Age: 62
End: 2017-02-14

## 2017-02-24 ENCOUNTER — OUTPATIENT (OUTPATIENT)
Dept: OUTPATIENT SERVICES | Facility: HOSPITAL | Age: 62
LOS: 1 days | End: 2017-02-24

## 2017-02-24 VITALS
RESPIRATION RATE: 14 BRPM | TEMPERATURE: 98 F | SYSTOLIC BLOOD PRESSURE: 126 MMHG | DIASTOLIC BLOOD PRESSURE: 80 MMHG | HEART RATE: 64 BPM | WEIGHT: 158.07 LBS | HEIGHT: 65.5 IN

## 2017-02-24 DIAGNOSIS — C61 MALIGNANT NEOPLASM OF PROSTATE: ICD-10-CM

## 2017-02-24 DIAGNOSIS — R97.20 ELEVATED PROSTATE SPECIFIC ANTIGEN [PSA]: ICD-10-CM

## 2017-02-24 DIAGNOSIS — Z09 ENCOUNTER FOR FOLLOW-UP EXAMINATION AFTER COMPLETED TREATMENT FOR CONDITIONS OTHER THAN MALIGNANT NEOPLASM: Chronic | ICD-10-CM

## 2017-02-24 LAB
BLD GP AB SCN SERPL QL: NEGATIVE — SIGNIFICANT CHANGE UP
BUN SERPL-MCNC: 22 MG/DL — SIGNIFICANT CHANGE UP (ref 7–23)
CALCIUM SERPL-MCNC: 9.6 MG/DL — SIGNIFICANT CHANGE UP (ref 8.4–10.5)
CHLORIDE SERPL-SCNC: 104 MMOL/L — SIGNIFICANT CHANGE UP (ref 98–107)
CO2 SERPL-SCNC: 26 MMOL/L — SIGNIFICANT CHANGE UP (ref 22–31)
CREAT SERPL-MCNC: 1.17 MG/DL — SIGNIFICANT CHANGE UP (ref 0.5–1.3)
GLUCOSE SERPL-MCNC: 96 MG/DL — SIGNIFICANT CHANGE UP (ref 70–99)
HCT VFR BLD CALC: 44.2 % — SIGNIFICANT CHANGE UP (ref 39–50)
HGB BLD-MCNC: 15.1 G/DL — SIGNIFICANT CHANGE UP (ref 13–17)
MCHC RBC-ENTMCNC: 30 PG — SIGNIFICANT CHANGE UP (ref 27–34)
MCHC RBC-ENTMCNC: 34.2 % — SIGNIFICANT CHANGE UP (ref 32–36)
MCV RBC AUTO: 87.7 FL — SIGNIFICANT CHANGE UP (ref 80–100)
PLATELET # BLD AUTO: 303 K/UL — SIGNIFICANT CHANGE UP (ref 150–400)
PMV BLD: 9.9 FL — SIGNIFICANT CHANGE UP (ref 7–13)
POTASSIUM SERPL-MCNC: 4.5 MMOL/L — SIGNIFICANT CHANGE UP (ref 3.5–5.3)
POTASSIUM SERPL-SCNC: 4.5 MMOL/L — SIGNIFICANT CHANGE UP (ref 3.5–5.3)
RBC # BLD: 5.04 M/UL — SIGNIFICANT CHANGE UP (ref 4.2–5.8)
RBC # FLD: 13.6 % — SIGNIFICANT CHANGE UP (ref 10.3–14.5)
RH IG SCN BLD-IMP: POSITIVE — SIGNIFICANT CHANGE UP
SODIUM SERPL-SCNC: 143 MMOL/L — SIGNIFICANT CHANGE UP (ref 135–145)
WBC # BLD: 8.04 K/UL — SIGNIFICANT CHANGE UP (ref 3.8–10.5)
WBC # FLD AUTO: 8.04 K/UL — SIGNIFICANT CHANGE UP (ref 3.8–10.5)

## 2017-02-24 RX ORDER — SODIUM CHLORIDE 9 MG/ML
1000 INJECTION, SOLUTION INTRAVENOUS
Qty: 0 | Refills: 0 | Status: DISCONTINUED | OUTPATIENT
Start: 2017-03-06 | End: 2017-03-07

## 2017-02-24 RX ORDER — LISINOPRIL 2.5 MG/1
1 TABLET ORAL
Qty: 0 | Refills: 0 | COMMUNITY

## 2017-02-24 NOTE — H&P PST ADULT - RS GEN PE MLT RESP DETAILS PC
no chest wall tenderness/no wheezes/no intercostal retractions/no rhonchi/airway patent/clear to auscultation bilaterally/no subcutaneous emphysema/no rales/breath sounds equal/good air movement/respirations non-labored

## 2017-02-24 NOTE — H&P PST ADULT - NEGATIVE GENERAL GENITOURINARY SYMPTOMS
no renal colic/no flank pain R/no nocturia/no urinary hesitancy/no hematuria/no flank pain L/no bladder infections/normal urinary frequency/no incontinence/h/o prostatitis in Jan 2017/no dysuria

## 2017-02-24 NOTE — H&P PST ADULT - NEGATIVE ENMT SYMPTOMS
no dysphagia/no nasal obstruction/no nasal discharge/no nose bleeds/no ear pain/no vertigo/no hearing difficulty/no sinus symptoms/no gum bleeding/no throat pain/no tinnitus/no nasal congestion

## 2017-02-24 NOTE — H&P PST ADULT - HISTORY OF PRESENT ILLNESS
61 yrs old male with h/o elevated PSA done in Nov 2016. pt was advised to have biopsy that showed malignancy neoplasm of prostate. Pt also stated that he developed prostatitis in Jan 2017 after the prostate biopsy for which he was treated.  PAtient scheduled for a robotic assisted laparoscopic radical prostatectomy with possible pelvic lymphnode dissection 61 yrs old male with h/o elevated PSA done in Nov 2016. pt was advised to have biopsy that showed malignancy neoplasm of prostate. Pt also stated that he developed prostatitis in Jan 2017 after the prostate biopsy for which he was treated.  Patient scheduled for a robotic assisted laparoscopic radical prostatectomy with possible pelvic lymphnode dissection.

## 2017-02-24 NOTE — H&P PST ADULT - PROBLEM SELECTOR PLAN 1
Scheduled for laparoscopic prostatectomy on 3/6/17  pre op instructions given  chlorohexidine scrub with instructions Scheduled for laparoscopic prostatectomy on 3/6/17  pre op instructions and famotidine provided  chlorhexidine scrub with instructions

## 2017-02-24 NOTE — H&P PST ADULT - NEUROLOGICAL DETAILS
sensation intact/superficial reflexes intact/deep reflexes intact/normal strength/alert and oriented x 3/cranial nerves intact/no spontaneous movement

## 2017-02-24 NOTE — H&P PST ADULT - MUSCULOSKELETAL COMMENTS
c/o left knee pain, swelling and associated limitations in ROM limitations in ROM w/ internal and external rotation of the LEFT knee on exam in PAST

## 2017-02-24 NOTE — H&P PST ADULT - PMH
Childhood asthma  not taking any meds now  Dyslipidemia    Elevated PSA    H/O: HTN (hypertension)    Prostatitis  Nov 2016

## 2017-02-24 NOTE — H&P PST ADULT - ASSESSMENT
Patient scheduled for a robotic assisted laparoscopic radical prostatectomy with possible pelvic lymphnode dissection. Patient scheduled for a robotic assisted laparoscopic radical prostatectomy with possible pelvic lymphnode dissection3/6/17.

## 2017-02-24 NOTE — H&P PST ADULT - NEGATIVE CARDIOVASCULAR SYMPTOMS
no palpitations/no peripheral edema/no paroxysmal nocturnal dyspnea/no dyspnea on exertion/no chest pain/no orthopnea/no claudication

## 2017-02-24 NOTE — H&P PST ADULT - VENOUS THROMBOEMBOLISM CURRENT STATUS
major surgery, including arthroscopic and laparoscopic (greater than 1 hr) major surgery lasting over 3 hrs

## 2017-02-24 NOTE — H&P PST ADULT - GASTROINTESTINAL DETAILS
no organomegaly/no bruit/no guarding/no distention/soft/no masses palpable/bowel sounds normal/no rigidity/no rebound tenderness/nontender

## 2017-02-24 NOTE — H&P PST ADULT - NSANTHOSAYNRD_GEN_A_CORE
No. HEENA screening performed.  STOP BANG Legend: 0-2 = LOW Risk; 3-4 = INTERMEDIATE Risk; 5-8 = HIGH Risk

## 2017-02-24 NOTE — H&P PST ADULT - NEGATIVE GASTROINTESTINAL SYMPTOMS
no change in bowel habits/no nausea/no abdominal pain/no constipation/no melena/no vomiting/no hematochezia/no diarrhea

## 2017-02-27 ENCOUNTER — FORM ENCOUNTER (OUTPATIENT)
Age: 62
End: 2017-02-27

## 2017-02-28 ENCOUNTER — APPOINTMENT (OUTPATIENT)
Dept: MRI IMAGING | Facility: CLINIC | Age: 62
End: 2017-02-28

## 2017-02-28 ENCOUNTER — OUTPATIENT (OUTPATIENT)
Dept: OUTPATIENT SERVICES | Facility: HOSPITAL | Age: 62
LOS: 1 days | End: 2017-02-28
Payer: COMMERCIAL

## 2017-02-28 DIAGNOSIS — Z09 ENCOUNTER FOR FOLLOW-UP EXAMINATION AFTER COMPLETED TREATMENT FOR CONDITIONS OTHER THAN MALIGNANT NEOPLASM: Chronic | ICD-10-CM

## 2017-02-28 DIAGNOSIS — C61 MALIGNANT NEOPLASM OF PROSTATE: ICD-10-CM

## 2017-02-28 PROCEDURE — 82565 ASSAY OF CREATININE: CPT

## 2017-02-28 PROCEDURE — 72197 MRI PELVIS W/O & W/DYE: CPT

## 2017-03-05 ENCOUNTER — RESULT REVIEW (OUTPATIENT)
Age: 62
End: 2017-03-05

## 2017-03-06 ENCOUNTER — APPOINTMENT (OUTPATIENT)
Dept: UROLOGY | Facility: HOSPITAL | Age: 62
End: 2017-03-06

## 2017-03-06 ENCOUNTER — INPATIENT (INPATIENT)
Facility: HOSPITAL | Age: 62
LOS: 1 days | Discharge: HOME | End: 2017-03-08
Attending: UROLOGY | Admitting: UROLOGY
Payer: COMMERCIAL

## 2017-03-06 ENCOUNTER — RESULT REVIEW (OUTPATIENT)
Age: 62
End: 2017-03-06

## 2017-03-06 ENCOUNTER — MESSAGE (OUTPATIENT)
Age: 62
End: 2017-03-06

## 2017-03-06 VITALS
HEART RATE: 60 BPM | HEIGHT: 65.5 IN | WEIGHT: 156.97 LBS | DIASTOLIC BLOOD PRESSURE: 85 MMHG | RESPIRATION RATE: 16 BRPM | SYSTOLIC BLOOD PRESSURE: 125 MMHG | OXYGEN SATURATION: 98 % | TEMPERATURE: 98 F

## 2017-03-06 DIAGNOSIS — C61 MALIGNANT NEOPLASM OF PROSTATE: ICD-10-CM

## 2017-03-06 DIAGNOSIS — Z09 ENCOUNTER FOR FOLLOW-UP EXAMINATION AFTER COMPLETED TREATMENT FOR CONDITIONS OTHER THAN MALIGNANT NEOPLASM: Chronic | ICD-10-CM

## 2017-03-06 LAB
BASE EXCESS BLDA CALC-SCNC: -2.1 MMOL/L — SIGNIFICANT CHANGE UP
CA-I BLDA-SCNC: 1.17 MMOL/L — SIGNIFICANT CHANGE UP (ref 1.15–1.29)
GLUCOSE BLDA-MCNC: 143 MG/DL — HIGH (ref 70–99)
HCO3 BLDA-SCNC: 22 MMOL/L — SIGNIFICANT CHANGE UP (ref 22–26)
HCT VFR BLDA CALC: 38 % — LOW (ref 39–51)
HGB BLDA-MCNC: 12.3 G/DL — LOW (ref 13–17)
PCO2 BLDA: 47 MMHG — SIGNIFICANT CHANGE UP (ref 35–48)
PH BLDA: 7.32 PH — LOW (ref 7.35–7.45)
PO2 BLDA: 264 MMHG — HIGH (ref 83–108)
POTASSIUM BLDA-SCNC: 4.6 MMOL/L — HIGH (ref 3.4–4.5)
RH IG SCN BLD-IMP: POSITIVE — SIGNIFICANT CHANGE UP
SAO2 % BLDA: 100 % — HIGH (ref 95–99)
SODIUM BLDA-SCNC: 133 MMOL/L — LOW (ref 136–146)

## 2017-03-06 PROCEDURE — 38571 LAPAROSCOPY LYMPHADENECTOMY: CPT

## 2017-03-06 PROCEDURE — 88332 PATH CONSLTJ SURG EA ADD BLK: CPT | Mod: 26

## 2017-03-06 PROCEDURE — 55866 LAPS SURG PRST8ECT RPBIC RAD: CPT

## 2017-03-06 PROCEDURE — 88309 TISSUE EXAM BY PATHOLOGIST: CPT | Mod: 26

## 2017-03-06 PROCEDURE — 88331 PATH CONSLTJ SURG 1 BLK 1SPC: CPT | Mod: 26

## 2017-03-06 PROCEDURE — 88305 TISSUE EXAM BY PATHOLOGIST: CPT | Mod: 26

## 2017-03-06 RX ORDER — HYDROMORPHONE HYDROCHLORIDE 2 MG/ML
0.8 INJECTION INTRAMUSCULAR; INTRAVENOUS; SUBCUTANEOUS
Qty: 0 | Refills: 0 | Status: DISCONTINUED | OUTPATIENT
Start: 2017-03-06 | End: 2017-03-06

## 2017-03-06 RX ORDER — PIPERACILLIN AND TAZOBACTAM 4; .5 G/20ML; G/20ML
3.38 INJECTION, POWDER, LYOPHILIZED, FOR SOLUTION INTRAVENOUS EVERY 8 HOURS
Qty: 0 | Refills: 0 | Status: COMPLETED | OUTPATIENT
Start: 2017-03-06 | End: 2017-03-07

## 2017-03-06 RX ORDER — LISINOPRIL 2.5 MG/1
1 TABLET ORAL
Qty: 0 | Refills: 0 | COMMUNITY

## 2017-03-06 RX ORDER — HYDROMORPHONE HYDROCHLORIDE 2 MG/ML
0.4 INJECTION INTRAMUSCULAR; INTRAVENOUS; SUBCUTANEOUS
Qty: 0 | Refills: 0 | Status: DISCONTINUED | OUTPATIENT
Start: 2017-03-06 | End: 2017-03-06

## 2017-03-06 RX ORDER — ACETAMINOPHEN 500 MG
650 TABLET ORAL EVERY 6 HOURS
Qty: 0 | Refills: 0 | Status: DISCONTINUED | OUTPATIENT
Start: 2017-03-06 | End: 2017-03-08

## 2017-03-06 RX ORDER — LACTOBACILLUS ACIDOPHILUS 100MM CELL
1 CAPSULE ORAL
Qty: 0 | Refills: 0 | Status: DISCONTINUED | OUTPATIENT
Start: 2017-03-06 | End: 2017-03-08

## 2017-03-06 RX ORDER — ATORVASTATIN CALCIUM 80 MG/1
10 TABLET, FILM COATED ORAL AT BEDTIME
Qty: 0 | Refills: 0 | Status: DISCONTINUED | OUTPATIENT
Start: 2017-03-06 | End: 2017-03-08

## 2017-03-06 RX ORDER — KETOROLAC TROMETHAMINE 30 MG/ML
30 SYRINGE (ML) INJECTION EVERY 6 HOURS
Qty: 0 | Refills: 0 | Status: DISCONTINUED | OUTPATIENT
Start: 2017-03-06 | End: 2017-03-08

## 2017-03-06 RX ORDER — MEPERIDINE HYDROCHLORIDE 50 MG/ML
12.5 INJECTION INTRAMUSCULAR; INTRAVENOUS; SUBCUTANEOUS
Qty: 0 | Refills: 0 | Status: DISCONTINUED | OUTPATIENT
Start: 2017-03-06 | End: 2017-03-08

## 2017-03-06 RX ORDER — HEPARIN SODIUM 5000 [USP'U]/ML
5000 INJECTION INTRAVENOUS; SUBCUTANEOUS EVERY 8 HOURS
Qty: 0 | Refills: 0 | Status: DISCONTINUED | OUTPATIENT
Start: 2017-03-06 | End: 2017-03-08

## 2017-03-06 RX ORDER — LISINOPRIL 2.5 MG/1
5 TABLET ORAL DAILY
Qty: 0 | Refills: 0 | Status: DISCONTINUED | OUTPATIENT
Start: 2017-03-06 | End: 2017-03-08

## 2017-03-06 RX ADMIN — SODIUM CHLORIDE 100 MILLILITER(S): 9 INJECTION, SOLUTION INTRAVENOUS at 21:21

## 2017-03-06 RX ADMIN — ATORVASTATIN CALCIUM 10 MILLIGRAM(S): 80 TABLET, FILM COATED ORAL at 21:20

## 2017-03-06 RX ADMIN — PIPERACILLIN AND TAZOBACTAM 25 GRAM(S): 4; .5 INJECTION, POWDER, LYOPHILIZED, FOR SOLUTION INTRAVENOUS at 17:31

## 2017-03-06 RX ADMIN — HEPARIN SODIUM 5000 UNIT(S): 5000 INJECTION INTRAVENOUS; SUBCUTANEOUS at 21:20

## 2017-03-06 RX ADMIN — HEPARIN SODIUM 5000 UNIT(S): 5000 INJECTION INTRAVENOUS; SUBCUTANEOUS at 13:45

## 2017-03-06 RX ADMIN — Medication 30 MILLIGRAM(S): at 18:48

## 2017-03-06 RX ADMIN — LISINOPRIL 5 MILLIGRAM(S): 2.5 TABLET ORAL at 17:31

## 2017-03-06 NOTE — BRIEF OPERATIVE NOTE - PROCEDURE
Pelvic lymph node dissection  03/06/2017    Active  WWU1  Prostatectomy, laparoscopic, robot-assisted  03/06/2017    Active  WWU1

## 2017-03-07 ENCOUNTER — TRANSCRIPTION ENCOUNTER (OUTPATIENT)
Age: 62
End: 2017-03-07

## 2017-03-07 LAB
CREAT FLD-MCNC: 1.11 MG/DL — SIGNIFICANT CHANGE UP
SPECIMEN SOURCE: SIGNIFICANT CHANGE UP

## 2017-03-07 PROCEDURE — 99223 1ST HOSP IP/OBS HIGH 75: CPT

## 2017-03-07 RX ORDER — DOCUSATE SODIUM 100 MG
100 CAPSULE ORAL THREE TIMES A DAY
Qty: 0 | Refills: 0 | Status: DISCONTINUED | OUTPATIENT
Start: 2017-03-07 | End: 2017-03-08

## 2017-03-07 RX ORDER — SENNA PLUS 8.6 MG/1
2 TABLET ORAL AT BEDTIME
Qty: 0 | Refills: 0 | Status: DISCONTINUED | OUTPATIENT
Start: 2017-03-07 | End: 2017-03-08

## 2017-03-07 RX ORDER — SODIUM CHLORIDE 9 MG/ML
1000 INJECTION, SOLUTION INTRAVENOUS
Qty: 0 | Refills: 0 | Status: DISCONTINUED | OUTPATIENT
Start: 2017-03-07 | End: 2017-03-08

## 2017-03-07 RX ADMIN — HEPARIN SODIUM 5000 UNIT(S): 5000 INJECTION INTRAVENOUS; SUBCUTANEOUS at 12:35

## 2017-03-07 RX ADMIN — Medication 100 MILLIGRAM(S): at 21:17

## 2017-03-07 RX ADMIN — ATORVASTATIN CALCIUM 10 MILLIGRAM(S): 80 TABLET, FILM COATED ORAL at 21:17

## 2017-03-07 RX ADMIN — Medication 1 TABLET(S): at 18:07

## 2017-03-07 RX ADMIN — LISINOPRIL 5 MILLIGRAM(S): 2.5 TABLET ORAL at 12:36

## 2017-03-07 RX ADMIN — HEPARIN SODIUM 5000 UNIT(S): 5000 INJECTION INTRAVENOUS; SUBCUTANEOUS at 21:17

## 2017-03-07 RX ADMIN — SENNA PLUS 2 TABLET(S): 8.6 TABLET ORAL at 21:17

## 2017-03-07 RX ADMIN — HEPARIN SODIUM 5000 UNIT(S): 5000 INJECTION INTRAVENOUS; SUBCUTANEOUS at 05:36

## 2017-03-07 RX ADMIN — Medication 1 TABLET(S): at 09:24

## 2017-03-07 RX ADMIN — SODIUM CHLORIDE 75 MILLILITER(S): 9 INJECTION, SOLUTION INTRAVENOUS at 08:02

## 2017-03-07 RX ADMIN — Medication 30 MILLIGRAM(S): at 00:25

## 2017-03-07 RX ADMIN — Medication 30 MILLIGRAM(S): at 12:36

## 2017-03-07 RX ADMIN — SODIUM CHLORIDE 75 MILLILITER(S): 9 INJECTION, SOLUTION INTRAVENOUS at 21:17

## 2017-03-07 RX ADMIN — PIPERACILLIN AND TAZOBACTAM 25 GRAM(S): 4; .5 INJECTION, POWDER, LYOPHILIZED, FOR SOLUTION INTRAVENOUS at 00:25

## 2017-03-07 RX ADMIN — Medication 30 MILLIGRAM(S): at 05:36

## 2017-03-07 RX ADMIN — Medication 100 MILLIGRAM(S): at 12:36

## 2017-03-07 RX ADMIN — Medication 30 MILLIGRAM(S): at 18:07

## 2017-03-08 ENCOUNTER — TRANSCRIPTION ENCOUNTER (OUTPATIENT)
Age: 62
End: 2017-03-08

## 2017-03-08 VITALS
SYSTOLIC BLOOD PRESSURE: 127 MMHG | TEMPERATURE: 98 F | OXYGEN SATURATION: 100 % | DIASTOLIC BLOOD PRESSURE: 78 MMHG | HEART RATE: 73 BPM | RESPIRATION RATE: 16 BRPM

## 2017-03-08 LAB — BACTERIA UR CULT: SIGNIFICANT CHANGE UP

## 2017-03-08 PROCEDURE — 99233 SBSQ HOSP IP/OBS HIGH 50: CPT

## 2017-03-08 RX ORDER — OXYCODONE HYDROCHLORIDE 5 MG/1
2 TABLET ORAL
Qty: 56 | Refills: 0 | OUTPATIENT
Start: 2017-03-08 | End: 2017-03-15

## 2017-03-08 RX ORDER — OXYCODONE HYDROCHLORIDE 5 MG/1
1 TABLET ORAL
Qty: 0 | Refills: 0 | COMMUNITY
Start: 2017-03-08

## 2017-03-08 RX ADMIN — HEPARIN SODIUM 5000 UNIT(S): 5000 INJECTION INTRAVENOUS; SUBCUTANEOUS at 05:11

## 2017-03-08 RX ADMIN — Medication 30 MILLIGRAM(S): at 05:11

## 2017-03-08 RX ADMIN — Medication 100 MILLIGRAM(S): at 05:11

## 2017-03-08 RX ADMIN — Medication 1 TABLET(S): at 09:14

## 2017-03-08 RX ADMIN — Medication 30 MILLIGRAM(S): at 00:50

## 2017-03-08 NOTE — DISCHARGE NOTE ADULT - NS AS ACTIVITY OBS
Walking-Indoors allowed/driving in 5 days if not needing pain meds/No Heavy lifting/straining/Showering allowed/Walking-Outdoors allowed/Stairs allowed

## 2017-03-08 NOTE — DISCHARGE NOTE ADULT - MEDICATION SUMMARY - MEDICATIONS TO TAKE
I will START or STAY ON the medications listed below when I get home from the hospital:    acetaminophen-oxyCODONE 325 mg-5 mg oral tablet  -- 1 tab(s) by mouth every 4 hours, As needed, Moderate Pain (4 - 6)  -- Indication: For Pain    acetaminophen-oxyCODONE 325 mg-5 mg oral tablet  -- 1-2 tab(s) by mouth every 6 hours, As needed, Severe Pain (7 - 10) MDD:8  -- Indication: For same    lisinopril 5 mg oral tablet  -- 1 tab(s) by mouth once a day in pm  -- Indication: For home med    pravastatin 10 mg oral tablet  -- 1 tab(s) by mouth once a day in pm  -- Indication: For home med

## 2017-03-08 NOTE — DISCHARGE NOTE ADULT - CARE PROVIDER_API CALL
Riley Cheek), Urology  [address] [place] [address]  Phone: [phone]  Fax: [phone]

## 2017-03-08 NOTE — DISCHARGE NOTE ADULT - CONDITIONS AT DISCHARGE
Abdominal steri strip clean, dry and intact.  +void, tolerated po and fluid. Abdominal steri strip clean, dry and intact. KARINA insertion site with gauze dressing, +void, tolerated po and fluid.  Allen to bedside draining yellow urine.

## 2017-03-08 NOTE — DISCHARGE NOTE ADULT - NS AS DC FOLLOWUP STROKE INST
robot assisted laparoscopic prostatectomy, percocet torres cathetar placement and care, robot assisted laparoscopic prostatectomy, percocet

## 2017-03-08 NOTE — DISCHARGE NOTE ADULT - INSTRUCTIONS
as tolerated Call MD for any complain of difficulty urinating, bloody urine, fever and a return appointment. Call MD for any complain of small amount of urine, bloody urine in torres, fever and a return appointment, nausea or vomiting.

## 2017-03-08 NOTE — DISCHARGE NOTE ADULT - PLAN OF CARE
prostatectomy as above; drink plenty of fluids; empty bag as instructed; use leg bag during day and larger bag for nite; avoid constipation with stool softener or fiber; call office for fever over 101 and for nausea/vomiting

## 2017-03-08 NOTE — DISCHARGE NOTE ADULT - HOSPITAL COURSE
Pt had RALP; did well; passed gas yesterday and brenda reg diet last nite; ambulating; urine clear; KARINA creat/serum equal; KARINA removed; home day 2

## 2017-03-08 NOTE — DISCHARGE NOTE ADULT - PATIENT PORTAL LINK FT
“You can access the FollowHealth Patient Portal, offered by [facility], by registering with the following website: [email or web address]”

## 2017-03-08 NOTE — DISCHARGE NOTE ADULT - CARE PLAN
Principal Discharge DX:	Elevated PSA  Goal:	prostatectomy  Instructions for follow-up, activity and diet:	as above; drink plenty of fluids; empty bag as instructed; use leg bag during day and larger bag for nite; avoid constipation with stool softener or fiber; call office for fever over 101 and for nausea/vomiting

## 2017-03-08 NOTE — DISCHARGE NOTE ADULT - CARE PROVIDERS DIRECT ADDRESSES
,[email or web address]

## 2017-03-16 ENCOUNTER — APPOINTMENT (OUTPATIENT)
Dept: UROLOGY | Facility: CLINIC | Age: 62
End: 2017-03-16

## 2017-03-22 LAB — SURGICAL PATHOLOGY STUDY: SIGNIFICANT CHANGE UP

## 2017-03-23 ENCOUNTER — MESSAGE (OUTPATIENT)
Age: 62
End: 2017-03-23

## 2017-03-23 ENCOUNTER — RESULT REVIEW (OUTPATIENT)
Age: 62
End: 2017-03-23

## 2017-03-28 ENCOUNTER — APPOINTMENT (OUTPATIENT)
Dept: UROLOGY | Facility: CLINIC | Age: 62
End: 2017-03-28

## 2017-03-28 VITALS — DIASTOLIC BLOOD PRESSURE: 70 MMHG | HEART RATE: 76 BPM | SYSTOLIC BLOOD PRESSURE: 103 MMHG | TEMPERATURE: 98.6 F

## 2017-03-28 RX ORDER — SULFAMETHOXAZOLE AND TRIMETHOPRIM 800; 160 MG/1; MG/1
800-160 TABLET ORAL
Qty: 6 | Refills: 0 | Status: COMPLETED | COMMUNITY
Start: 2016-12-19

## 2017-03-28 RX ORDER — FLUTICASONE PROPIONATE 50 UG/1
50 SPRAY, METERED NASAL
Qty: 16 | Refills: 0 | Status: ACTIVE | COMMUNITY
Start: 2016-12-31

## 2017-03-28 RX ORDER — AZITHROMYCIN 250 MG/1
250 TABLET, FILM COATED ORAL
Qty: 6 | Refills: 0 | Status: COMPLETED | COMMUNITY
Start: 2016-12-31

## 2017-03-28 RX ORDER — CIPROFLOXACIN HYDROCHLORIDE 500 MG/1
500 TABLET, FILM COATED ORAL
Qty: 56 | Refills: 0 | Status: COMPLETED | COMMUNITY
Start: 2017-01-13

## 2017-05-02 ENCOUNTER — APPOINTMENT (OUTPATIENT)
Dept: UROLOGY | Facility: CLINIC | Age: 62
End: 2017-05-02

## 2017-05-02 VITALS — DIASTOLIC BLOOD PRESSURE: 78 MMHG | HEART RATE: 69 BPM | TEMPERATURE: 98.5 F | SYSTOLIC BLOOD PRESSURE: 115 MMHG

## 2017-05-02 LAB — PSA SERPL-MCNC: 0.01 NG/ML

## 2017-05-24 ENCOUNTER — RESULT REVIEW (OUTPATIENT)
Age: 62
End: 2017-05-24

## 2017-08-15 ENCOUNTER — APPOINTMENT (OUTPATIENT)
Dept: UROLOGY | Facility: CLINIC | Age: 62
End: 2017-08-15
Payer: COMMERCIAL

## 2017-08-15 VITALS
SYSTOLIC BLOOD PRESSURE: 122 MMHG | RESPIRATION RATE: 16 BRPM | HEART RATE: 64 BPM | DIASTOLIC BLOOD PRESSURE: 80 MMHG | TEMPERATURE: 98.1 F

## 2017-08-15 PROCEDURE — 99213 OFFICE O/P EST LOW 20 MIN: CPT

## 2017-08-18 LAB — PSA SERPL-MCNC: <0.01 NG/ML

## 2017-11-07 ENCOUNTER — APPOINTMENT (OUTPATIENT)
Dept: UROLOGY | Facility: CLINIC | Age: 62
End: 2017-11-07
Payer: COMMERCIAL

## 2017-11-07 VITALS — SYSTOLIC BLOOD PRESSURE: 129 MMHG | DIASTOLIC BLOOD PRESSURE: 89 MMHG | HEART RATE: 61 BPM | TEMPERATURE: 97.6 F

## 2017-11-07 PROCEDURE — 99213 OFFICE O/P EST LOW 20 MIN: CPT

## 2017-11-07 RX ORDER — OXYBUTYNIN CHLORIDE 10 MG/1
10 TABLET, EXTENDED RELEASE ORAL DAILY
Qty: 30 | Refills: 3 | Status: COMPLETED | COMMUNITY
Start: 2017-03-28 | End: 2017-11-07

## 2017-11-15 LAB — PSA SERPL-MCNC: <0.01 NG/ML

## 2018-02-26 LAB — PSA SERPL-MCNC: <0.01 NG/ML

## 2018-05-08 ENCOUNTER — APPOINTMENT (OUTPATIENT)
Dept: UROLOGY | Facility: CLINIC | Age: 63
End: 2018-05-08
Payer: COMMERCIAL

## 2018-05-08 VITALS — DIASTOLIC BLOOD PRESSURE: 80 MMHG | TEMPERATURE: 98 F | SYSTOLIC BLOOD PRESSURE: 126 MMHG | HEART RATE: 69 BPM

## 2018-05-08 DIAGNOSIS — Z87.448 PERSONAL HISTORY OF OTHER DISEASES OF URINARY SYSTEM: ICD-10-CM

## 2018-05-08 LAB — PSA SERPL-MCNC: <0.01 NG/ML

## 2018-05-08 PROCEDURE — 99214 OFFICE O/P EST MOD 30 MIN: CPT

## 2018-11-06 ENCOUNTER — APPOINTMENT (OUTPATIENT)
Dept: UROLOGY | Facility: CLINIC | Age: 63
End: 2018-11-06
Payer: COMMERCIAL

## 2018-11-06 VITALS
DIASTOLIC BLOOD PRESSURE: 74 MMHG | SYSTOLIC BLOOD PRESSURE: 138 MMHG | BODY MASS INDEX: 27.66 KG/M2 | RESPIRATION RATE: 17 BRPM | TEMPERATURE: 98.5 F | HEART RATE: 63 BPM | HEIGHT: 64 IN | WEIGHT: 162 LBS

## 2018-11-06 DIAGNOSIS — Z87.898 PERSONAL HISTORY OF OTHER SPECIFIED CONDITIONS: ICD-10-CM

## 2018-11-06 LAB — PSA SERPL-MCNC: <0.01 NG/ML

## 2018-11-06 PROCEDURE — 99214 OFFICE O/P EST MOD 30 MIN: CPT

## 2019-04-30 LAB — PSA SERPL-MCNC: <0.01 NG/ML

## 2019-05-02 PROBLEM — R97.20 ELEVATED PROSTATE SPECIFIC ANTIGEN [PSA]: Chronic | Status: ACTIVE | Noted: 2017-02-24

## 2019-05-02 PROBLEM — N41.9 INFLAMMATORY DISEASE OF PROSTATE, UNSPECIFIED: Chronic | Status: ACTIVE | Noted: 2017-02-24

## 2019-10-30 LAB — PSA SERPL-MCNC: 0.01 NG/ML

## 2019-11-05 ENCOUNTER — APPOINTMENT (OUTPATIENT)
Dept: UROLOGY | Facility: CLINIC | Age: 64
End: 2019-11-05
Payer: COMMERCIAL

## 2019-11-05 PROCEDURE — 99213 OFFICE O/P EST LOW 20 MIN: CPT

## 2019-11-05 NOTE — HISTORY OF PRESENT ILLNESS
[FreeTextEntry1] : s/p Robotic RP, PLND March 6, 2017\par Path: Haylee 4+3, pT3aN0, positive margin at ARLINE.  Haylee pattern 3 at margin.\par Post path Decipher: Low risk.  \par Opted against adjuvant RT at this time.\par \par \par PSA 10/25/2019: 0.01 ng/mL.\par \par Good urine flow, complete emptying.\par Continues to have very mild stress incontinence with lifting, bending.  Does not usually use pad  but mostly dry. Partial erectile function recovery, about 50% of full rigidity.  \par \par Patient denies gross hematuria, dysuria or other s/s UTI.\par Patient denies abdominal pain, flank pain, lower extremity edema, anorexia, unexpected weight loss, excessive fatigue, fever/chills, nausea/vomiting.\par \par There have not been significant changes in medical/surgical hx since the last visit.\par The medication list and allergies were reviewed.\par

## 2019-11-05 NOTE — ASSESSMENT
[FreeTextEntry1] : Doing well.  Labs reviewed.  Follow-up in 6 months with repeat PSA prior to visit.

## 2020-04-30 NOTE — REVIEW OF SYSTEMS
[Incontinence] : incontinence [Leakage of urine with urgency] : leakage of urine with urgency [Poor quality erections] : Poor quality erections [Leakage of urine with straining, coughing, laughing] : leakage of urine with straining, coughing, laughing [Negative] : Heme/Lymph

## 2020-05-04 ENCOUNTER — APPOINTMENT (OUTPATIENT)
Dept: UROLOGY | Facility: CLINIC | Age: 65
End: 2020-05-04
Payer: COMMERCIAL

## 2020-05-04 DIAGNOSIS — N52.31 ERECTILE DYSFUNCTION FOLLOWING RADICAL PROSTATECTOMY: ICD-10-CM

## 2020-05-04 DIAGNOSIS — N39.3 STRESS INCONTINENCE (FEMALE) (MALE): ICD-10-CM

## 2020-05-04 LAB — PSA SERPL-MCNC: <0.01 NG/ML

## 2020-05-04 PROCEDURE — 99213 OFFICE O/P EST LOW 20 MIN: CPT | Mod: 95

## 2020-05-04 RX ORDER — PREDNISOLONE ACETATE 10 MG/ML
1 SUSPENSION/ DROPS OPHTHALMIC
Qty: 5 | Refills: 0 | Status: COMPLETED | COMMUNITY
Start: 2016-08-20 | End: 2020-05-04

## 2020-05-04 NOTE — LETTER BODY
[Dear  ___] : Dear  [unfilled], [Consult Letter:] : I had the pleasure of evaluating your patient, [unfilled]. [Please see my note below.] : Please see my note below. [Consult Closing:] : Thank you very much for allowing me to participate in the care of this patient.  If you have any questions, please do not hesitate to contact me. [Sincerely,] : Sincerely, [FreeTextEntry3] : Pranay Lo MD\par System Chief of Urology, [facility] [place]\par  for Urologic Research\par  of Urology\par [facility]\par [facility] \par [address] [place] [address], [address] \[place] [address] \par Office: [phone] \par Fax: [phone]

## 2020-05-04 NOTE — HISTORY OF PRESENT ILLNESS
[Other Location: e.g. School (Enter Location, City,State)___] : at [unfilled], at the time of the visit. [Medical Office: ([facility])___] : at the medical office located in  [FreeTextEntry1] : The patient-doctor relationship has been established in a face to face fashion via real time video/audio HIPAA compliant communication using telemedicine software.  The patient's identity has been confirmed.  The patient was previously emailed a copy of the telemedicine consent.  He has had a chance to review and has now given verbal consent and has requested care to be assessed and treated through telemedicine and understands there maybe limitations in this process and they may need further follow up care in the office and or hospital settings.   \par  \par Verbal consent given on May  4 2020  8:30AM by  Mr. TRISTAN HOANG. He is performing the visit at home, [address] [place] [address] [address]\[place] [address].\par \par The patient, TRISTAN HOANG, his wife and Dr. Lo participated in the Telemedicine encounter.\par \par s/p Robotic RP, PLND March 6, 2017\par Path: [place] 4+3, pT3aN0, positive margin at ARLINE. [place] pattern 3 at margin.\par Post path Decipher: Low risk. \par Opted against adjuvant RT.\par \par Doing well.  Urinary function stable.  Has very mild stress incontinence. Usually does not wear pad.  Normal urinary stream, complete emptying.  Minimal frequency.  No dysuria, hematuria.  No abdominal or flank pain.  No fever/chills, SOB, Cough.\par \par Has had partial erectile function recovery, 60-70% of normal.  Does not want to use PDE5 inhibitors.  Not causing bother.\par \par All pertinent parts of the patient PFSH (past medical, family and social histories), laboratory, radiological studies and physician notes were reviewed prior to starting the face to face portion of the telemedicine visit. \par \par There have not been significant changes in medical/surgical hx since the last visit.\par The medication list and allergies were reviewed and updated.\par

## 2020-05-04 NOTE — ASSESSMENT
[FreeTextEntry1] : Doing well. Recent PSA remains undetectable.\par Encourage continued Kegel exercises.  Discussed additional intervention for stress incontinence including male urethral sling.\par \par Continue PSA q6 months.  Will call with next 6 month result.\par \par Office visit in 1 year.

## 2020-05-07 ENCOUNTER — APPOINTMENT (OUTPATIENT)
Dept: UROLOGY | Facility: CLINIC | Age: 65
End: 2020-05-07

## 2020-11-12 LAB — PSA SERPL-MCNC: <0.01 NG/ML

## 2021-11-18 LAB — PSA SERPL-MCNC: <0.01 NG/ML

## 2022-11-01 ENCOUNTER — APPOINTMENT (OUTPATIENT)
Dept: UROLOGY | Facility: CLINIC | Age: 67
End: 2022-11-01

## 2022-12-09 DIAGNOSIS — Z85.46 PERSONAL HISTORY OF MALIGNANT NEOPLASM OF PROSTATE: ICD-10-CM

## 2022-12-09 LAB — PSA SERPL-MCNC: <0.01 NG/ML

## 2023-04-03 NOTE — ASU PATIENT PROFILE, ADULT - MUTUALITY COMMENT, PROFILE
Patient with Paroxysmal (<7 days) atrial fibrillation which is controlled currently with Amiodarone. Patient is currently in sinus rhythm.[id number] Score: 4. Anticoagulation indicated. Anticoagulation done with coumadin.    Pts INR subtherapeutic, increase dose to 5mg and monitor       pain

## 2024-08-09 NOTE — ED PROVIDER NOTE - ATTENDING CONTRIBUTION TO CARE
Instructions for your surgery at [facility]      Today's Date:  8/9/2024      Patient's Name:  Lorelei Sierra           Surgery Date:  08/14/2024              Please enter the main entrance of the hospital and check-in at the front security desk located in the lobby. They will direct you to the area to report for your surgery.     Do NOT eat or drink anything, including candy, gum, or ice chips after midnight prior to your surgery, unless you have specific instructions from your surgeon or anesthesia provider to do so.  Brush your teeth before coming to the hospital. You may swish with water, but do not swallow.  No smoking/Vaping/E-Cigarettes 24 hours prior to the day of surgery.  No alcohol 24 hours prior to the day of surgery.  No recreational drugs for one week prior to the day of surgery.  Bring Photo ID, Insurance information, and Co-pay if required on day of surgery.  Bring in pertinent legal documents, such as, Medical Power of , DNR, Advance Directive, etc.  Leave all valuables, including money/purse, at home.  Remove all jewelry, including ALL body piercings, nail polish, acrylic nails, and makeup (including mascara); no lotions, powders, deodorant, or perfume/cologne/after shave on the skin.  Follow instruction for Hibiclens washes and CHG wipes from surgeon's office.   Glasses and dentures may be worn to the hospital. They must be removed prior to surgery. Please bring case/container for glasses or dentures.   Contact lenses should not be worn on day of surgery.   Call your doctor's office if symptoms of a cold or illness develop within 24-48 hours prior to your surgery.  Call your doctor's office if you have any questions concerning insurance or co-pays.  15. AN ADULT (relative or friend 18 years or older) MUST DRIVE YOU HOME AFTER YOUR SURGERY.  16. Please make arrangements for a responsible adult (18 years or older) to be with you for 24 hours after your surgery.   17.  Dr. Agosto: I have personally seen and examined this patient at the bedside. I have fully participated in the care of this patient. I have reviewed all pertinent clinical information, including history, physical exam, plan and the Resident's note and agree except as noted. 61M hx htn hld p/w subj fever, chills, bodyaches. 12/25 sinus infection, 12/31 seen at [facility] and discharged with zpack which he has since completed, 1/6 prostate biopsy. POD 3 chills, sweat, POD 4 subj f. Today POD 7 fever tmax 102.6 cough  and suprapubic discomfort. Denies rhinorrhea, sore throat, sob, bates. Denies melena, dysuria, hematuria pod 1 has since cleared. PE nad, aaox2, ctabl, rrr, s1s2, abd soft suprapubic tender neg guard neg rebound, neg le edema. MDM prostatitis +- pna versus uri. PLAN cxr r/o post op pna cbc diff cmp ua ucx, will dc on cipro bid x 4 wks, +/- abx if xr shows pna. Neg respiratory distress, neg bates, likely dc home.